# Patient Record
Sex: MALE | Race: BLACK OR AFRICAN AMERICAN | NOT HISPANIC OR LATINO | Employment: OTHER | URBAN - METROPOLITAN AREA
[De-identification: names, ages, dates, MRNs, and addresses within clinical notes are randomized per-mention and may not be internally consistent; named-entity substitution may affect disease eponyms.]

---

## 2023-09-18 PROBLEM — I10 HYPERTENSION: Status: ACTIVE | Noted: 2023-09-18

## 2023-09-18 PROBLEM — E26.9 HYPERALDOSTERONISM (MULTI): Status: ACTIVE | Noted: 2023-09-18

## 2023-09-18 PROBLEM — H40.1193: Status: ACTIVE | Noted: 2023-09-18

## 2023-09-18 PROBLEM — H25.13 NUCLEAR SCLEROSIS OF BOTH EYES: Status: ACTIVE | Noted: 2023-09-18

## 2023-09-18 PROBLEM — H47.013 ISCHEMIC OPTIC NEUROPATHY, BILATERAL: Status: ACTIVE | Noted: 2023-09-18

## 2023-09-18 PROBLEM — H04.123 BILATERAL DRY EYES: Status: ACTIVE | Noted: 2023-09-18

## 2023-09-18 PROBLEM — H40.1113 PRIMARY OPEN ANGLE GLAUCOMA OF RIGHT EYE, SEVERE STAGE: Status: ACTIVE | Noted: 2023-09-18

## 2023-09-18 RX ORDER — DORZOLAMIDE HYDROCHLORIDE AND TIMOLOL MALEATE 20; 5 MG/ML; MG/ML
SOLUTION/ DROPS OPHTHALMIC DAILY
COMMUNITY

## 2023-09-18 RX ORDER — CARVEDILOL 12.5 MG/1
1 TABLET ORAL
COMMUNITY
Start: 2020-10-21

## 2023-09-18 RX ORDER — NAPROXEN SODIUM 220 MG/1
1 TABLET, FILM COATED ORAL DAILY
COMMUNITY

## 2023-09-18 RX ORDER — SPIRONOLACTONE 50 MG/1
1 TABLET, FILM COATED ORAL DAILY
COMMUNITY
Start: 2020-12-03

## 2023-09-18 RX ORDER — TAMSULOSIN HYDROCHLORIDE 0.4 MG/1
1 CAPSULE ORAL NIGHTLY
COMMUNITY
Start: 2021-10-13

## 2023-09-18 RX ORDER — CLONIDINE HYDROCHLORIDE 0.1 MG/1
1 TABLET ORAL 2 TIMES DAILY
COMMUNITY

## 2023-09-18 RX ORDER — ATORVASTATIN CALCIUM 20 MG/1
1 TABLET, FILM COATED ORAL DAILY
COMMUNITY

## 2023-09-18 RX ORDER — LATANOPROST 50 UG/ML
1 SOLUTION/ DROPS OPHTHALMIC NIGHTLY
COMMUNITY
Start: 2022-05-25 | End: 2024-04-03 | Stop reason: SDUPTHER

## 2023-09-18 RX ORDER — METFORMIN HYDROCHLORIDE 500 MG/1
1 TABLET ORAL 2 TIMES DAILY
COMMUNITY

## 2023-09-18 RX ORDER — ERGOCALCIFEROL 1.25 MG/1
1 CAPSULE ORAL
COMMUNITY
Start: 2021-05-27

## 2023-09-18 RX ORDER — AMLODIPINE BESYLATE 5 MG/1
1 TABLET ORAL DAILY
COMMUNITY

## 2023-10-19 ENCOUNTER — OFFICE VISIT (OUTPATIENT)
Dept: OPHTHALMOLOGY | Facility: CLINIC | Age: 78
End: 2023-10-19
Payer: MEDICARE

## 2023-10-19 DIAGNOSIS — H47.013 ISCHEMIC OPTIC NEUROPATHY OF BOTH EYES: Primary | ICD-10-CM

## 2023-10-19 PROCEDURE — 99212 OFFICE O/P EST SF 10 MIN: CPT | Performed by: OPHTHALMOLOGY

## 2023-10-19 PROCEDURE — 92083 EXTENDED VISUAL FIELD XM: CPT | Performed by: OPHTHALMOLOGY

## 2023-10-19 RX ORDER — POTASSIUM CHLORIDE 1500 MG/1
20 TABLET, EXTENDED RELEASE ORAL DAILY
COMMUNITY
Start: 2023-04-08

## 2023-10-19 RX ORDER — LISINOPRIL 40 MG/1
40 TABLET ORAL DAILY
COMMUNITY
Start: 2023-09-20

## 2023-10-19 RX ORDER — ROSUVASTATIN CALCIUM 5 MG/1
5 TABLET, COATED ORAL
COMMUNITY
Start: 2010-05-03

## 2023-10-19 RX ORDER — LISINOPRIL AND HYDROCHLOROTHIAZIDE 20; 25 MG/1; MG/1
1 TABLET ORAL
COMMUNITY
Start: 2010-06-14

## 2023-10-19 ASSESSMENT — VISUAL ACUITY
OS_PH_SC: 20/25
OD_SC+: +2
OS_SC+: -2
METHOD: SNELLEN - LINEAR
OS_SC: 20/40
OS_PH_SC+: -2
OD_SC: 20/50
OD_PH_SC+: -1
OD_PH_SC: 20/20

## 2023-10-19 ASSESSMENT — ENCOUNTER SYMPTOMS
ALLERGIC/IMMUNOLOGIC NEGATIVE: 0
NEUROLOGICAL NEGATIVE: 0
EYES NEGATIVE: 0
CONSTITUTIONAL NEGATIVE: 0
CARDIOVASCULAR NEGATIVE: 0
MUSCULOSKELETAL NEGATIVE: 0
ENDOCRINE NEGATIVE: 0
GASTROINTESTINAL NEGATIVE: 0
RESPIRATORY NEGATIVE: 0
HEMATOLOGIC/LYMPHATIC NEGATIVE: 0
PSYCHIATRIC NEGATIVE: 0

## 2023-10-19 ASSESSMENT — CUP TO DISC RATIO
OS_RATIO: 0.85
OD_RATIO: 0.8

## 2023-10-19 ASSESSMENT — SLIT LAMP EXAM - LIDS
COMMENTS: NORMAL
COMMENTS: NORMAL

## 2023-10-19 ASSESSMENT — PACHYMETRY
OD_CT(UM): 544
OS_CT(UM): 533

## 2023-10-19 ASSESSMENT — TONOMETRY
IOP_METHOD: GOLDMANN APPLANATION
OS_IOP_MMHG: 8
OD_IOP_MMHG: 8

## 2023-10-19 ASSESSMENT — GONIOSCOPY: OD_SUPERIOR: 3+ 360

## 2024-04-03 DIAGNOSIS — H47.013 ISCHEMIC OPTIC NEUROPATHY OF BOTH EYES: Primary | ICD-10-CM

## 2024-04-06 RX ORDER — LATANOPROST 50 UG/ML
1 SOLUTION/ DROPS OPHTHALMIC NIGHTLY
Qty: 2.5 ML | Refills: 6 | Status: SHIPPED | OUTPATIENT
Start: 2024-04-06 | End: 2024-04-22 | Stop reason: SDUPTHER

## 2024-04-22 DIAGNOSIS — H47.013 ISCHEMIC OPTIC NEUROPATHY OF BOTH EYES: Primary | ICD-10-CM

## 2024-04-23 RX ORDER — LATANOPROST 50 UG/ML
1 SOLUTION/ DROPS OPHTHALMIC NIGHTLY
Qty: 7.5 ML | Refills: 3 | Status: SHIPPED | OUTPATIENT
Start: 2024-04-23

## 2024-04-25 ENCOUNTER — APPOINTMENT (OUTPATIENT)
Dept: OPHTHALMOLOGY | Facility: CLINIC | Age: 79
End: 2024-04-25
Payer: MEDICARE

## 2024-08-15 ENCOUNTER — APPOINTMENT (OUTPATIENT)
Dept: OPHTHALMOLOGY | Facility: CLINIC | Age: 79
End: 2024-08-15
Payer: MEDICARE

## 2024-08-15 DIAGNOSIS — H47.013 ISCHEMIC OPTIC NEUROPATHY OF BOTH EYES: Primary | ICD-10-CM

## 2024-08-15 PROCEDURE — 99214 OFFICE O/P EST MOD 30 MIN: CPT | Performed by: OPHTHALMOLOGY

## 2024-08-15 PROCEDURE — 92133 CPTRZD OPH DX IMG PST SGM ON: CPT | Performed by: OPHTHALMOLOGY

## 2024-08-15 ASSESSMENT — ENCOUNTER SYMPTOMS
RESPIRATORY NEGATIVE: 0
HEMATOLOGIC/LYMPHATIC NEGATIVE: 0
MUSCULOSKELETAL NEGATIVE: 0
PSYCHIATRIC NEGATIVE: 0
ENDOCRINE NEGATIVE: 0
EYES NEGATIVE: 0
NEUROLOGICAL NEGATIVE: 0
CONSTITUTIONAL NEGATIVE: 0
ALLERGIC/IMMUNOLOGIC NEGATIVE: 0
GASTROINTESTINAL NEGATIVE: 0
CARDIOVASCULAR NEGATIVE: 0

## 2024-08-15 ASSESSMENT — TONOMETRY
OD_IOP_MMHG: 10
IOP_METHOD: GOLDMANN APPLANATION
OS_IOP_MMHG: 10

## 2024-08-15 ASSESSMENT — GONIOSCOPY
OD_SUPERIOR: 3/360
OS_SUPERIOR: 3/360

## 2024-08-15 ASSESSMENT — VISUAL ACUITY
OS_SC+: -2
OD_SC+: -2
METHOD: SNELLEN - LINEAR
OS_SC: 20/25
OD_SC: 20/25

## 2024-08-15 ASSESSMENT — PACHYMETRY
OD_CT(UM): 544
OS_CT(UM): 533

## 2024-08-15 ASSESSMENT — CUP TO DISC RATIO
OD_RATIO: 0.8
OS_RATIO: 0.85

## 2024-08-15 ASSESSMENT — SLIT LAMP EXAM - LIDS
COMMENTS: NORMAL
COMMENTS: NORMAL

## 2025-02-20 ENCOUNTER — APPOINTMENT (OUTPATIENT)
Dept: OPHTHALMOLOGY | Facility: CLINIC | Age: 80
End: 2025-02-20
Payer: MEDICARE

## 2025-02-20 DIAGNOSIS — H40.1130 PRIMARY OPEN ANGLE GLAUCOMA OF BOTH EYES, UNSPECIFIED GLAUCOMA STAGE: Primary | ICD-10-CM

## 2025-02-20 DIAGNOSIS — H47.013 ISCHEMIC OPTIC NEUROPATHY OF BOTH EYES: ICD-10-CM

## 2025-02-20 PROCEDURE — 99214 OFFICE O/P EST MOD 30 MIN: CPT | Performed by: OPHTHALMOLOGY

## 2025-02-20 PROCEDURE — 92083 EXTENDED VISUAL FIELD XM: CPT | Performed by: OPHTHALMOLOGY

## 2025-02-20 RX ORDER — LATANOPROST 50 UG/ML
1 SOLUTION/ DROPS OPHTHALMIC NIGHTLY
Qty: 7.5 ML | Refills: 3 | Status: SHIPPED | OUTPATIENT
Start: 2025-02-20 | End: 2025-05-21

## 2025-02-20 ASSESSMENT — CUP TO DISC RATIO
OD_RATIO: 0.8
OS_RATIO: 0.85

## 2025-02-20 ASSESSMENT — CONF VISUAL FIELD
OD_INFERIOR_NASAL_RESTRICTION: 0
OD_SUPERIOR_TEMPORAL_RESTRICTION: 0
OD_SUPERIOR_NASAL_RESTRICTION: 0
OS_SUPERIOR_TEMPORAL_RESTRICTION: 0
OS_NORMAL: 1
OD_INFERIOR_TEMPORAL_RESTRICTION: 0
OS_INFERIOR_NASAL_RESTRICTION: 0
OD_NORMAL: 1
OS_INFERIOR_TEMPORAL_RESTRICTION: 0
OS_SUPERIOR_NASAL_RESTRICTION: 0

## 2025-02-20 ASSESSMENT — TONOMETRY
IOP_METHOD: GOLDMANN APPLANATION
OS_IOP_MMHG: 11
OD_IOP_MMHG: 12

## 2025-02-20 ASSESSMENT — VISUAL ACUITY
OS_SC+: -2
OD_SC: 20/25
OS_SC: 20/30
OD_SC+: -1
METHOD: SNELLEN - LINEAR

## 2025-02-20 ASSESSMENT — PACHYMETRY
OD_CT(UM): 544
OS_CT(UM): 533

## 2025-02-20 ASSESSMENT — SLIT LAMP EXAM - LIDS
COMMENTS: NORMAL
COMMENTS: NORMAL

## 2025-07-24 ENCOUNTER — APPOINTMENT (OUTPATIENT)
Dept: OPHTHALMOLOGY | Facility: CLINIC | Age: 80
End: 2025-07-24
Payer: COMMERCIAL